# Patient Record
Sex: MALE | Race: WHITE | NOT HISPANIC OR LATINO | URBAN - METROPOLITAN AREA
[De-identification: names, ages, dates, MRNs, and addresses within clinical notes are randomized per-mention and may not be internally consistent; named-entity substitution may affect disease eponyms.]

---

## 2024-02-27 ENCOUNTER — OFFICE VISIT (OUTPATIENT)
Age: 34
End: 2024-02-27

## 2024-02-27 VITALS
DIASTOLIC BLOOD PRESSURE: 72 MMHG | RESPIRATION RATE: 18 BRPM | HEART RATE: 86 BPM | SYSTOLIC BLOOD PRESSURE: 120 MMHG | OXYGEN SATURATION: 98 % | TEMPERATURE: 97.2 F | BODY MASS INDEX: 33.8 KG/M2 | WEIGHT: 255 LBS | HEIGHT: 73 IN

## 2024-02-27 DIAGNOSIS — J02.0 STREP PHARYNGITIS: Primary | ICD-10-CM

## 2024-02-27 LAB — S PYO AG THROAT QL: POSITIVE

## 2024-02-27 PROCEDURE — G0382 LEV 3 HOSP TYPE B ED VISIT: HCPCS | Performed by: PHYSICIAN ASSISTANT

## 2024-02-27 PROCEDURE — 87880 STREP A ASSAY W/OPTIC: CPT | Performed by: PHYSICIAN ASSISTANT

## 2024-02-27 RX ORDER — AZITHROMYCIN 250 MG/1
TABLET, FILM COATED ORAL
Qty: 6 TABLET | Refills: 0 | Status: SHIPPED | OUTPATIENT
Start: 2024-02-27 | End: 2024-03-02

## 2024-02-27 NOTE — PROGRESS NOTES
[facility]        NAME: Brendan López is a 33 y.o. male  : 1990    MRN: [id number]  DATE: 2024  TIME: 9:12 AM    Assessment and Plan   Strep pharyngitis [J02.0]  1. Strep pharyngitis  POCT rapid strep A    azithromycin (ZITHROMAX) 250 mg tablet            Patient Instructions     Discussed condition with pt. Rapid Strep A screen is positive. I will start the pt on oral abx and rec hydration, rest, discussed pain control, soft diet, fever management, and observation.     Follow up with PCP in 3-5 days.  Proceed to  ER if symptoms worsen.    Chief Complaint     Chief Complaint   Patient presents with    flu like symptoms     Since Thursday last week. Wife he believes had the flu and was seen by urgent care yesterday. One kid at home has strep. Not testing at home. Taking tylenol cold and flu. Symptoms are aching, fever, chills, coughing, congestion in sinuses, sore throat is new from yesterday.          History of Present Illness       Patient presents with onset 5 days ago of flulike symptoms and believes that he did have influenza.  He was seen yesterday by another urgent care for his symptoms.  He was exposed to strep with a child at home that recently tested positive.  He has recent onset of sore throat but has had ongoing congestion, cough, fever, chills, myalgias, headache.  Denies shortness of breath or wheezing, NVD.  He has not home tested for COVID.  He has taken Tylenol Cold and flu.        Review of Systems   Review of Systems   Constitutional:  Positive for chills and fever.   HENT:  Positive for congestion and sore throat.    Respiratory:  Positive for cough. Negative for shortness of breath and wheezing.    Cardiovascular: Negative.    Gastrointestinal: Negative.    Genitourinary: Negative.    Musculoskeletal:  Positive for myalgias.   Neurological:  Positive for headaches.         Current Medications       Current Outpatient Medications:     azithromycin (ZITHROMAX)  "250 mg tablet, Take 2 tablets today then 1 tablet daily x 4 days, Disp: 6 tablet, Rfl: 0    Current Allergies     Allergies as of 02/27/2024 - Reviewed 02/27/2024   Allergen Reaction Noted    Amoxicillin Other (See Comments) 02/27/2024            The following portions of the patient's history were reviewed and updated as appropriate: allergies, current medications, past family history, past medical history, past social history, past surgical history and problem list.     History reviewed. No pertinent past medical history.    Past Surgical History:   Procedure Laterality Date    INGUINAL HERNIA REPAIR         Family History   Problem Relation Age of Onset    No Known Problems Mother     Diabetes Father          Medications have been verified.        Objective   /72   Pulse 86   Temp (!) 97.2 °F (36.2 °C)   Resp 18   Ht 6' 1\" (1.854 m)   Wt 116 kg (255 lb)   SpO2 98%   BMI 33.64 kg/m²   No LMP for male patient.       Physical Exam     Physical Exam  Vitals reviewed.   Constitutional:       General: He is not in acute distress.     Appearance: He is well-developed.   HENT:      Right Ear: Hearing, tympanic membrane, ear canal and external ear normal.      Left Ear: Hearing, tympanic membrane, ear canal and external ear normal.      Nose: Mucosal edema (B/L boggy turbinates) and congestion present.      Mouth/Throat:      Mouth: Mucous membranes are moist.      Pharynx: Posterior oropharyngeal erythema (PND) present. No oropharyngeal exudate.      Tonsils: No tonsillar exudate.   Cardiovascular:      Rate and Rhythm: Normal rate and regular rhythm.      Heart sounds: Normal heart sounds. No murmur heard.  Pulmonary:      Effort: Pulmonary effort is normal. No respiratory distress.      Breath sounds: Normal breath sounds.   Musculoskeletal:      Cervical back: Neck supple.   Lymphadenopathy:      Cervical: No cervical adenopathy.   Neurological:      Mental Status: He is alert and oriented to person, " place, and time.

## 2025-04-14 ENCOUNTER — APPOINTMENT (OUTPATIENT)
Age: 35
End: 2025-04-14
Attending: EMERGENCY MEDICINE
Payer: COMMERCIAL

## 2025-04-14 ENCOUNTER — OFFICE VISIT (OUTPATIENT)
Age: 35
End: 2025-04-14
Payer: COMMERCIAL

## 2025-04-14 VITALS
HEART RATE: 60 BPM | WEIGHT: 270 LBS | SYSTOLIC BLOOD PRESSURE: 130 MMHG | DIASTOLIC BLOOD PRESSURE: 88 MMHG | HEIGHT: 73 IN | TEMPERATURE: 99.5 F | RESPIRATION RATE: 16 BRPM | OXYGEN SATURATION: 99 % | BODY MASS INDEX: 35.78 KG/M2

## 2025-04-14 DIAGNOSIS — Z23 ENCOUNTER FOR IMMUNIZATION: ICD-10-CM

## 2025-04-14 DIAGNOSIS — S61.432A PUNCTURE WOUND OF LEFT HAND WITHOUT FOREIGN BODY, INITIAL ENCOUNTER: Primary | ICD-10-CM

## 2025-04-14 DIAGNOSIS — S69.92XA INJURY OF LEFT HAND, INITIAL ENCOUNTER: ICD-10-CM

## 2025-04-14 PROCEDURE — 90715 TDAP VACCINE 7 YRS/> IM: CPT

## 2025-04-14 PROCEDURE — 99213 OFFICE O/P EST LOW 20 MIN: CPT | Performed by: EMERGENCY MEDICINE

## 2025-04-14 PROCEDURE — 73120 X-RAY EXAM OF HAND: CPT

## 2025-04-14 RX ORDER — SULFAMETHOXAZOLE AND TRIMETHOPRIM 800; 160 MG/1; MG/1
1 TABLET ORAL EVERY 12 HOURS SCHEDULED
Qty: 14 TABLET | Refills: 0 | Status: SHIPPED | OUTPATIENT
Start: 2025-04-14 | End: 2025-04-21

## 2025-04-14 NOTE — PATIENT INSTRUCTIONS
"Patient Education     Taking care of puncture wounds   The Basics   Written by the doctors and editors at [facility]   Does my cut need stitches? -- If your cut does not go all the way through the skin, it does not need stitches (figure 1). If your cut is wide, jagged, or does go all the way through the skin, you will most likely need stitches.  If you are not sure if your cut needs stitches, check with your doctor or nurse. Sometimes they will use special staples instead of stitches. Doctors can also use a special type of skin glue to close certain types of cuts.  This article is about caring for minor wounds (like small cuts and scrapes) that do not need to be closed with stitches, staples, or skin glue. If you got stitches, staples, or glue, your doctor or nurse will tell you how to care for yourself.  What if I have a puncture wound? -- A \"puncture wound\" is a type of cut that is made when a sharp object, like a nail, goes through the skin and into the tissue underneath. This type of wound can also be caused by animal or human bites. Puncture wounds are more likely than other minor wounds to get infected.  If you were bitten by an animal or human, see your doctor or nurse. Bite wounds need special care.  How do I take care of a minor wound on my own? -- To take care of your wound, follow these basic first aid guidelines:   Clean the wound - Wash it well with soap and water. If there is dirt, glass, or another object in your cut that you can't get out after you wash it, call your doctor or nurse.   Stop the bleeding - If your wound is bleeding, press a clean cloth or bandage firmly on the area for 20 minutes. You can also help slow the bleeding by holding the wound above the level of your heart, if possible. If the bleeding doesn't stop after 20 minutes, call your doctor or nurse.   Put a thin layer of antibiotic ointment on the wound   Cover the wound with a bandage or gauze. Keep the bandage clean and dry. Change " the bandage 1 to 2 times every day until your wound heals.   Do not swim or soak your wound in water until it has healed. Ask your doctor or nurse if you have any questions.   Each time you change the bandage, look at your skin to check for signs of infection. These include redness that is getting worse or spreading, swelling, or warmth in the area. You might see some thin clear or yellow fluid as the wound heals, which is normal.  Most minor wounds heal on their own within 7 to 10 days. As your wound heals, a scab will form. Do not pick at the scab or scratch the skin around it.  When should I call the doctor or nurse? -- Call the doctor or nurse if you have any signs of an infection. Signs of an infection include:   Fever   Redness, swelling, warmth, or increased pain around the wound   A bad smell coming from the wound   Pus (thick yellow, green, or gray fluid) draining from the wound   Red streaks on the skin around the wound, or red streaks going up your arm or leg  Will I need a tetanus shot? -- Maybe. It depends on how old you are and when your last tetanus shot was. Tetanus is a serious infection that can cause muscle stiffness and spasms, and even lead to death. It is caused by bacteria (germs) that live in the dirt.  Most children get a tetanus vaccine as part of their routine check-ups. Vaccines can prevent certain serious or deadly infections. Many adults also get a tetanus vaccine as part of their routine check-ups. Getting all your vaccines is important, since it's possible to get tetanus even from a small wound.  If your skin is cut or punctured, and especially if the cut is dirty or deep, ask your doctor or nurse if you need a tetanus shot.  All topics are updated as new evidence becomes available and our peer review process is complete.  This topic retrieved from [employer] on: Mar 20, 2024.  Topic 99633 Version 11.0  Release: 32.2.4 - C32.78  © 2024 UpToDate, Inc. and/or its affiliates. All rights  "reserved.  figure 1: Minor cuts and scrapes     Picture A shows a scrape (also called an \"abrasion\"). The scrape doesn't go all the way through the skin, so it does not need stitches. Picture B shows a cut that does go all the way through the skin. This cut is deep, so it needs stitches.  Graphic 25965 Version 4.0  Consumer Information Use and Disclaimer   Disclaimer: This generalized information is a limited summary of diagnosis, treatment, and/or medication information. It is not meant to be comprehensive and should be used as a tool to help the user understand and/or assess potential diagnostic and treatment options. It does NOT include all information about conditions, treatments, medications, side effects, or risks that may apply to a specific patient. It is not intended to be medical advice or a substitute for the medical advice, diagnosis, or treatment of a health care provider based on the health care provider's examination and assessment of a patient's specific and unique circumstances. Patients must speak with a health care provider for complete information about their health, medical questions, and treatment options, including any risks or benefits regarding use of medications. This information does not endorse any treatments or medications as safe, effective, or approved for treating a specific patient. UpToDate, Inc. and its affiliates disclaim any warranty or liability relating to this information or the use thereof.The use of this information is governed by the Terms of Use, available at https://www.[employer].com/en/know/clinical-effectiveness-terms. 2024© UpToDate, Inc. and its affiliates and/or licensors. All rights reserved.  Copyright   © 2024 UpToDate, Inc. and/or its affiliates. All rights reserved.    "

## 2025-04-14 NOTE — PROGRESS NOTES
[facility]        NAME: Brendan López is a 34 y.o. male  : 1990    MRN: [id number]  DATE: 2025  TIME: 7:44 PM    Assessment and Plan   Puncture wound of left hand without foreign body, initial encounter [S61.432A]  1. Puncture wound of left hand without foreign body, initial encounter  sulfamethoxazole-trimethoprim (BACTRIM DS) 800-160 mg per tablet      2. Encounter for immunization  Tdap Vaccine greater than or equal to 8yo      3. Injury of left hand, initial encounter  XR hand 2 vw left      Universal Protocol:  Procedure performed by: (Wen SANZ)  Consent: Verbal consent obtained.  Risks and benefits: risks, benefits and alternatives were discussed  Consent given by: patient  Patient identity confirmed: verbally with patient  Laceration repair    Date/Time: 2025 7:30 PM    Performed by: Sathish Garcia MD  Authorized by: Sathish Garcia MD  Body area: upper extremity  Location details: left hand  Laceration length: 0.5 cm  Foreign bodies: no foreign bodies  Tendon involvement: none  Nerve involvement: none  Vascular damage: no      Procedure Details:  Irrigation solution: saline  Amount of cleaning: standard  Debridement: none  Degree of undermining: none  Dressing: 4x4 sterile gauze and antibiotic ointment  Patient tolerance: patient tolerated the procedure well with no immediate complications  Comments: Due to uncertain depth of the puncture wound will not close the wound by sutures or staple due to risk of infection.              Patient Instructions     Patient Instructions   Patient Education     Taking care of puncture wounds   The Basics   Written by the doctors and editors at [facility]   Does my cut need stitches? -- If your cut does not go all the way through the skin, it does not need stitches (figure 1). If your cut is wide, jagged, or does go all the way through the skin, you will most likely need stitches.  If you are not sure if your cut needs stitches, check with  "your doctor or nurse. Sometimes they will use special staples instead of stitches. Doctors can also use a special type of skin glue to close certain types of cuts.  This article is about caring for minor wounds (like small cuts and scrapes) that do not need to be closed with stitches, staples, or skin glue. If you got stitches, staples, or glue, your doctor or nurse will tell you how to care for yourself.  What if I have a puncture wound? -- A \"puncture wound\" is a type of cut that is made when a sharp object, like a nail, goes through the skin and into the tissue underneath. This type of wound can also be caused by animal or human bites. Puncture wounds are more likely than other minor wounds to get infected.  If you were bitten by an animal or human, see your doctor or nurse. Bite wounds need special care.  How do I take care of a minor wound on my own? -- To take care of your wound, follow these basic first aid guidelines:   Clean the wound - Wash it well with soap and water. If there is dirt, glass, or another object in your cut that you can't get out after you wash it, call your doctor or nurse.   Stop the bleeding - If your wound is bleeding, press a clean cloth or bandage firmly on the area for 20 minutes. You can also help slow the bleeding by holding the wound above the level of your heart, if possible. If the bleeding doesn't stop after 20 minutes, call your doctor or nurse.   Put a thin layer of antibiotic ointment on the wound   Cover the wound with a bandage or gauze. Keep the bandage clean and dry. Change the bandage 1 to 2 times every day until your wound heals.   Do not swim or soak your wound in water until it has healed. Ask your doctor or nurse if you have any questions.   Each time you change the bandage, look at your skin to check for signs of infection. These include redness that is getting worse or spreading, swelling, or warmth in the area. You might see some thin clear or yellow fluid as the " "wound heals, which is normal.  Most minor wounds heal on their own within 7 to 10 days. As your wound heals, a scab will form. Do not pick at the scab or scratch the skin around it.  When should I call the doctor or nurse? -- Call the doctor or nurse if you have any signs of an infection. Signs of an infection include:   Fever   Redness, swelling, warmth, or increased pain around the wound   A bad smell coming from the wound   Pus (thick yellow, green, or gray fluid) draining from the wound   Red streaks on the skin around the wound, or red streaks going up your arm or leg  Will I need a tetanus shot? -- Maybe. It depends on how old you are and when your last tetanus shot was. Tetanus is a serious infection that can cause muscle stiffness and spasms, and even lead to death. It is caused by bacteria (germs) that live in the dirt.  Most children get a tetanus vaccine as part of their routine check-ups. Vaccines can prevent certain serious or deadly infections. Many adults also get a tetanus vaccine as part of their routine check-ups. Getting all your vaccines is important, since it's possible to get tetanus even from a small wound.  If your skin is cut or punctured, and especially if the cut is dirty or deep, ask your doctor or nurse if you need a tetanus shot.  All topics are updated as new evidence becomes available and our peer review process is complete.  This topic retrieved from [employer] on: Mar 20, 2024.  Topic 50006 Version 11.0  Release: 32.2.4 - C32.78  © 2024 UpToDate, Inc. and/or its affiliates. All rights reserved.  figure 1: Minor cuts and scrapes     Picture A shows a scrape (also called an \"abrasion\"). The scrape doesn't go all the way through the skin, so it does not need stitches. Picture B shows a cut that does go all the way through the skin. This cut is deep, so it needs stitches.  Graphic 51209 Version 4.0  Consumer Information Use and Disclaimer   Disclaimer: This generalized information is a " limited summary of diagnosis, treatment, and/or medication information. It is not meant to be comprehensive and should be used as a tool to help the user understand and/or assess potential diagnostic and treatment options. It does NOT include all information about conditions, treatments, medications, side effects, or risks that may apply to a specific patient. It is not intended to be medical advice or a substitute for the medical advice, diagnosis, or treatment of a health care provider based on the health care provider's examination and assessment of a patient's specific and unique circumstances. Patients must speak with a health care provider for complete information about their health, medical questions, and treatment options, including any risks or benefits regarding use of medications. This information does not endorse any treatments or medications as safe, effective, or approved for treating a specific patient. UpToDate, Inc. and its affiliates disclaim any warranty or liability relating to this information or the use thereof.The use of this information is governed by the Terms of Use, available at https://www.[employer].com/en/know/clinical-effectiveness-terms. 2024© UpToDate, Inc. and its affiliates and/or licensors. All rights reserved.  Copyright   © 2024 UpToDate, Inc. and/or its affiliates. All rights reserved.      Follow up with PCP in 3-5 days.  Proceed to  ER if symptoms worsen.    Chief Complaint     Chief Complaint   Patient presents with    Hand Injury     Around 2pm stabbed himself accidentally in left thumb with screwdriver. Swelling and stiffness in left hand/thumb.         History of Present Illness       Patient complains of pain and swelling left hand since puncture wound to dorsal aspect of first second webspace when using a screwdriver 6 hours ago.  He denies foreign body sensation.        Review of Systems   Review of Systems   Constitutional:  Negative for chills and fever.  "  Musculoskeletal:  Negative for arthralgias, gait problem, joint swelling and myalgias.   Skin:  Positive for color change and wound. Negative for rash.   Neurological:  Negative for numbness.         Current Medications       Current Outpatient Medications:     sulfamethoxazole-trimethoprim (BACTRIM DS) 800-160 mg per tablet, Take 1 tablet by mouth every 12 (twelve) hours for 7 days, Disp: 14 tablet, Rfl: 0    Current Allergies     Allergies as of 04/14/2025 - Reviewed 04/14/2025   Allergen Reaction Noted    Amoxicillin Other (See Comments) 02/27/2024            The following portions of the patient's history were reviewed and updated as appropriate: allergies, current medications, past family history, past medical history, past social history, past surgical history and problem list.     History reviewed. No pertinent past medical history.    Past Surgical History:   Procedure Laterality Date    INGUINAL HERNIA REPAIR         Family History   Problem Relation Age of Onset    No Known Problems Mother     Diabetes Father          Medications have been verified.        Objective   /88 (Patient Position: Sitting)   Pulse 60   Temp 99.5 °F (37.5 °C)   Resp 16   Ht 6' 1\" (1.854 m)   Wt 122 kg (270 lb)   SpO2 99%   BMI 35.62 kg/m²        Physical Exam     Physical Exam  Vitals and nursing note reviewed.   Constitutional:       General: He is not in acute distress.     Appearance: He is well-developed. He is not ill-appearing or toxic-appearing.   HENT:      Head: Normocephalic and atraumatic.   Cardiovascular:      Rate and Rhythm: Normal rate and regular rhythm.   Pulmonary:      Effort: Pulmonary effort is normal.      Breath sounds: Normal breath sounds.   Musculoskeletal:         General: Swelling, tenderness and signs of injury present. No deformity.      Cervical back: Neck supple.   Skin:     General: Skin is warm and dry.      Findings: No erythema or rash.      Comments: Puncture wound dorsal aspect " of first second webspace of left hand with moderate swelling involving the webspace and the left palm   Neurological:      General: No focal deficit present.      Mental Status: He is alert and oriented to person, place, and time.      Deep Tendon Reflexes: Reflexes are normal and symmetric.   Psychiatric:         Mood and Affect: Mood normal.         Behavior: Behavior normal.         Thought Content: Thought content normal.         Judgment: Judgment normal.

## 2025-04-15 ENCOUNTER — RESULTS FOLLOW-UP (OUTPATIENT)
Age: 35
End: 2025-04-15